# Patient Record
Sex: MALE | Race: BLACK OR AFRICAN AMERICAN | NOT HISPANIC OR LATINO | Employment: UNEMPLOYED | URBAN - METROPOLITAN AREA
[De-identification: names, ages, dates, MRNs, and addresses within clinical notes are randomized per-mention and may not be internally consistent; named-entity substitution may affect disease eponyms.]

---

## 2024-11-06 ENCOUNTER — APPOINTMENT (OUTPATIENT)
Dept: PRIMARY CARE | Facility: CLINIC | Age: 64
End: 2024-11-06
Payer: COMMERCIAL

## 2024-11-11 ENCOUNTER — APPOINTMENT (OUTPATIENT)
Dept: PRIMARY CARE | Facility: CLINIC | Age: 64
End: 2024-11-11
Payer: COMMERCIAL

## 2024-11-11 VITALS
SYSTOLIC BLOOD PRESSURE: 170 MMHG | HEIGHT: 67 IN | BODY MASS INDEX: 32.62 KG/M2 | WEIGHT: 207.8 LBS | OXYGEN SATURATION: 97 % | DIASTOLIC BLOOD PRESSURE: 90 MMHG | HEART RATE: 110 BPM

## 2024-11-11 DIAGNOSIS — Z00.00 ROUTINE GENERAL MEDICAL EXAMINATION AT A HEALTH CARE FACILITY: ICD-10-CM

## 2024-11-11 DIAGNOSIS — N18.32 CKD STAGE 3B, GFR 30-44 ML/MIN (MULTI): ICD-10-CM

## 2024-11-11 DIAGNOSIS — Z12.11 SCREEN FOR COLON CANCER: ICD-10-CM

## 2024-11-11 DIAGNOSIS — Z12.5 SCREENING FOR PROSTATE CANCER: ICD-10-CM

## 2024-11-11 DIAGNOSIS — Z76.89 ENCOUNTER TO ESTABLISH CARE: Primary | ICD-10-CM

## 2024-11-11 DIAGNOSIS — I16.0 ASYMPTOMATIC HYPERTENSIVE URGENCY: ICD-10-CM

## 2024-11-11 DIAGNOSIS — Z90.49 H/O PARTIAL RESECTION OF COLON: ICD-10-CM

## 2024-11-11 DIAGNOSIS — Z85.038 H/O MALIGNANT NEOPLASM OF COLON: ICD-10-CM

## 2024-11-11 DIAGNOSIS — Z12.11 SCREENING FOR COLON CANCER: ICD-10-CM

## 2024-11-11 DIAGNOSIS — E78.2 MIXED HYPERLIPIDEMIA: ICD-10-CM

## 2024-11-11 DIAGNOSIS — I10 PRIMARY HYPERTENSION: ICD-10-CM

## 2024-11-11 PROCEDURE — 1036F TOBACCO NON-USER: CPT | Performed by: STUDENT IN AN ORGANIZED HEALTH CARE EDUCATION/TRAINING PROGRAM

## 2024-11-11 PROCEDURE — 99205 OFFICE O/P NEW HI 60 MIN: CPT | Performed by: STUDENT IN AN ORGANIZED HEALTH CARE EDUCATION/TRAINING PROGRAM

## 2024-11-11 PROCEDURE — 3008F BODY MASS INDEX DOCD: CPT | Performed by: STUDENT IN AN ORGANIZED HEALTH CARE EDUCATION/TRAINING PROGRAM

## 2024-11-11 PROCEDURE — 3080F DIAST BP >= 90 MM HG: CPT | Performed by: STUDENT IN AN ORGANIZED HEALTH CARE EDUCATION/TRAINING PROGRAM

## 2024-11-11 PROCEDURE — 3077F SYST BP >= 140 MM HG: CPT | Performed by: STUDENT IN AN ORGANIZED HEALTH CARE EDUCATION/TRAINING PROGRAM

## 2024-11-11 RX ORDER — LOSARTAN POTASSIUM 50 MG/1
50 TABLET ORAL
COMMUNITY
Start: 2023-05-05 | End: 2024-11-11 | Stop reason: SDUPTHER

## 2024-11-11 RX ORDER — AMLODIPINE BESYLATE 10 MG/1
10 TABLET ORAL
COMMUNITY
Start: 2023-05-05 | End: 2024-11-11 | Stop reason: SDUPTHER

## 2024-11-11 RX ORDER — METOPROLOL TARTRATE 100 MG/1
100 TABLET ORAL 2 TIMES DAILY
COMMUNITY
Start: 2023-05-05 | End: 2024-11-11 | Stop reason: SDUPTHER

## 2024-11-11 RX ORDER — LOVASTATIN 40 MG/1
40 TABLET ORAL
COMMUNITY
Start: 2023-05-05 | End: 2024-11-11 | Stop reason: SDUPTHER

## 2024-11-11 RX ORDER — LOSARTAN POTASSIUM 50 MG/1
50 TABLET ORAL
Qty: 90 TABLET | Refills: 1 | Status: SHIPPED | OUTPATIENT
Start: 2024-11-11

## 2024-11-11 RX ORDER — METOPROLOL TARTRATE 100 MG/1
100 TABLET ORAL 2 TIMES DAILY
Qty: 180 TABLET | Refills: 3 | Status: SHIPPED | OUTPATIENT
Start: 2024-11-11 | End: 2025-11-11

## 2024-11-11 RX ORDER — LOVASTATIN 40 MG/1
40 TABLET ORAL NIGHTLY
Qty: 90 TABLET | Refills: 3 | Status: SHIPPED | OUTPATIENT
Start: 2024-11-11

## 2024-11-11 RX ORDER — AMLODIPINE BESYLATE 10 MG/1
10 TABLET ORAL DAILY
Qty: 90 TABLET | Refills: 1 | Status: SHIPPED | OUTPATIENT
Start: 2024-11-11 | End: 2025-02-09

## 2024-11-11 NOTE — PROGRESS NOTES
"Subjective   Patient ID: Doug Moore is a 64 y.o. male who presents for New Patient Visit (Establish care. Pt declined flu shot. ).        HPI    Est care   Pt's PMH, PSH, SH, FH , meds and allergies was obtained / reviewed and updated .     Blood pressure is elevated in the office but denied any CP/ SOB/ HA/ Blurry vision / Slurry speech/ tingling/ numbness/ weakness of extremities.     Has ran out of med for a month now       H/o colon cancer s/p colon resection in 2016      Previous labs from 2023 reviewed   Anemia noted   Ckd stage 3b         Visit Vitals  /90   Pulse 110   Ht 1.69 m (5' 6.54\")   Wt 94.3 kg (207 lb 12.8 oz)   SpO2 97%   BMI 33.00 kg/m²   Smoking Status Never   BSA 2.1 m²      No LMP for male patient.   Current Outpatient Medications   Medication Instructions    amLODIPine (NORVASC) 10 mg, oral, Daily    losartan (COZAAR) 50 mg, oral, Daily RT    lovastatin (MEVACOR) 40 mg, oral, Nightly    metoprolol tartrate (LOPRESSOR) 100 mg, oral, 2 times daily      Social History     Tobacco Use    Smoking status: Never    Smokeless tobacco: Never   Substance Use Topics    Alcohol use: Not Currently        Review of Systems    Constitutional : No feeling poorly / fevers/ chills / night sweats/ fatigue   Cardiovascular : No CP /Palpitations/ lower extremity edema / syncope   Respiratory : No Cough /GONZALEZ/Dyspnea at rest   Gastrointestinal : No abd pain / N/V  No bloody stools/ melena / constipation  Endo : No polyuria/polydipsia/ muscle weakness / sluggishness   CNS: No confusion / HA/ tingling/ numbness/ weakness of extremities  Psychiatric: No anxiety/ depression/ SI/HI    All other systems have been reviewed and are negative for complaint       Physical Exam    Constitutional : Vitals reviewed. Alert and in no distress  Cardiovascular : RRR, Normal S1, S2, No pericardial rub/ gallop, no peripheral edema   Pulmonary: No respiratory distress, CTAB   MSK : Normal gait and station , strength and " tone   Skin: Warm to touch ,  normal skin turgor   Neurologic : CNs 2-12 grossly intact , no obvious FNDs  Psych : A,Ox3, normal mood and affect      Assessment/Plan   Diagnoses and all orders for this visit:  Encounter to establish care  Routine general medical examination at a health care facility  Primary hypertension  -     CBC; Future  -     Comprehensive Metabolic Panel; Future  -     Hemoglobin A1C; Future  -     amLODIPine (Norvasc) 10 mg tablet; Take 1 tablet (10 mg) by mouth once daily.  -     losartan (Cozaar) 50 mg tablet; Take 1 tablet (50 mg) by mouth once daily.  -     metoprolol tartrate (Lopressor) 100 mg tablet; Take 1 tablet (100 mg) by mouth 2 times a day.  Mixed hyperlipidemia  -     Lipid Panel; Future  -     TSH with reflex to Free T4 if abnormal; Future  -     lovastatin (Mevacor) 40 mg tablet; Take 1 tablet (40 mg) by mouth once daily at bedtime.  Screening for prostate cancer  -     Prostate Specific Antigen, Screen; Future  Asymptomatic hypertensive urgency  CKD stage 3b, GFR 30-44 ml/min (Multi)  -     Referral to Nephrology; Future  -     Iron and TIBC; Future  -     Vitamin B12; Future  -     Folate; Future  -     Ferritin; Future  -     Transferrin; Future  -     Uric Acid; Future  -     Vitamin D 25-Hydroxy,Total (for eval of Vitamin D levels); Future  -     Parathyroid Hormone, Intact; Future  -     Albumin-Creatinine Ratio, Urine Random; Future  Screening for colon cancer  Screen for colon cancer  -     Colonoscopy Screening; High Risk Patient; Future  H/O malignant neoplasm of colon  -     Colonoscopy Screening; High Risk Patient; Future  H/O partial resection of colon  -     Colonoscopy Screening; High Risk Patient; Future    65 y/o male with HTN ,CKD stage 3b, h/o colon cancer s/p partial resection in 2016 presents to [facility] care     Asymptomatic hypertensive urgency   Ran out of meds for 1 m  Refills as above   To RTO  in 2 weeks for BP check     Eval and mgmt for ckd stage 3b as  above     Anemia noted on labs in 2023 , pt reported iron supplementation at that time but  not taking currently   Rpt labs as above     H/o colon cancer   Rpt screening due per pt , ordered     Continue other meds         RTO in 2 weeks     New pt , high complexity for the above reasons         Conditions addressed and mgmt as noted above.  Pertinent labs, images/ imaging reports , chart review was done .   Age appropriate labs / labs for mgmt of chronic medical conditions ordered, further mgmt pending the results.       This note is intended for the physician writing it, as well as to communicate findings to other healthcare professionals. These notes use medical lexicon that may be misunderstood by non medical persons. Therefore, interpretations of medical notes and terminology should be approached with caution.

## 2024-11-26 ENCOUNTER — APPOINTMENT (OUTPATIENT)
Dept: PRIMARY CARE | Facility: CLINIC | Age: 64
End: 2024-11-26
Payer: COMMERCIAL

## 2025-06-04 DIAGNOSIS — I10 PRIMARY HYPERTENSION: ICD-10-CM

## 2025-06-05 RX ORDER — LOSARTAN POTASSIUM 50 MG/1
50 TABLET ORAL DAILY
Qty: 30 TABLET | Refills: 0 | Status: SHIPPED | OUTPATIENT
Start: 2025-06-05

## 2025-07-27 DIAGNOSIS — I10 PRIMARY HYPERTENSION: ICD-10-CM

## 2025-07-28 RX ORDER — LOSARTAN POTASSIUM 50 MG/1
50 TABLET ORAL DAILY
Qty: 15 TABLET | Refills: 0 | Status: SHIPPED | OUTPATIENT
Start: 2025-07-28

## 2025-09-02 DIAGNOSIS — I10 PRIMARY HYPERTENSION: ICD-10-CM

## 2025-09-05 RX ORDER — LOSARTAN POTASSIUM 50 MG/1
50 TABLET ORAL DAILY
Qty: 7 TABLET | Refills: 0 | Status: SHIPPED | OUTPATIENT
Start: 2025-09-05